# Patient Record
Sex: MALE | Race: WHITE | NOT HISPANIC OR LATINO | Employment: FULL TIME | URBAN - METROPOLITAN AREA
[De-identification: names, ages, dates, MRNs, and addresses within clinical notes are randomized per-mention and may not be internally consistent; named-entity substitution may affect disease eponyms.]

---

## 2024-04-09 RX ORDER — TAMSULOSIN HYDROCHLORIDE 0.4 MG/1
0.4 CAPSULE ORAL NIGHTLY
Qty: 90 CAPSULE | Refills: 1 | Status: SHIPPED | OUTPATIENT
Start: 2024-04-09 | End: 2025-04-09

## 2024-04-12 ENCOUNTER — OFFICE VISIT (OUTPATIENT)
Dept: UROLOGY | Facility: CLINIC | Age: 59
End: 2024-04-12
Payer: COMMERCIAL

## 2024-04-12 VITALS
BODY MASS INDEX: 34.39 KG/M2 | TEMPERATURE: 97 F | HEIGHT: 68 IN | RESPIRATION RATE: 14 BRPM | DIASTOLIC BLOOD PRESSURE: 79 MMHG | SYSTOLIC BLOOD PRESSURE: 122 MMHG | HEART RATE: 75 BPM | WEIGHT: 226.94 LBS

## 2024-04-12 DIAGNOSIS — R39.14 FEELING OF INCOMPLETE BLADDER EMPTYING: ICD-10-CM

## 2024-04-12 DIAGNOSIS — N39.43 POST-VOID DRIBBLING: Primary | ICD-10-CM

## 2024-04-12 LAB
BILIRUB UR QL STRIP: NEGATIVE
GLUCOSE UR QL STRIP: NEGATIVE
KETONES UR QL STRIP: NEGATIVE
LEUKOCYTE ESTERASE UR QL STRIP: NEGATIVE
PH, POC UA: 7
POC BLOOD, URINE: NEGATIVE
POC NITRATES, URINE: NEGATIVE
POC RESIDUAL URINE VOLUME: 0 ML (ref 0–100)
PROT UR QL STRIP: NEGATIVE
SP GR UR STRIP: 1.02 (ref 1–1.03)
UROBILINOGEN UR STRIP-ACNC: 1 (ref 0.3–2.2)

## 2024-04-12 PROCEDURE — 3074F SYST BP LT 130 MM HG: CPT | Mod: CPTII,S$GLB,, | Performed by: UROLOGY

## 2024-04-12 PROCEDURE — 99999 PR PBB SHADOW E&M-EST. PATIENT-LVL III: CPT | Mod: PBBFAC,,, | Performed by: UROLOGY

## 2024-04-12 PROCEDURE — 81003 URINALYSIS AUTO W/O SCOPE: CPT | Mod: QW,S$GLB,, | Performed by: UROLOGY

## 2024-04-12 PROCEDURE — 3078F DIAST BP <80 MM HG: CPT | Mod: CPTII,S$GLB,, | Performed by: UROLOGY

## 2024-04-12 PROCEDURE — 51798 US URINE CAPACITY MEASURE: CPT | Mod: S$GLB,,, | Performed by: UROLOGY

## 2024-04-12 PROCEDURE — 99214 OFFICE O/P EST MOD 30 MIN: CPT | Mod: S$GLB,,, | Performed by: UROLOGY

## 2024-04-12 PROCEDURE — 1159F MED LIST DOCD IN RCRD: CPT | Mod: CPTII,S$GLB,, | Performed by: UROLOGY

## 2024-04-12 PROCEDURE — 3008F BODY MASS INDEX DOCD: CPT | Mod: CPTII,S$GLB,, | Performed by: UROLOGY

## 2024-04-12 RX ORDER — TAMSULOSIN HYDROCHLORIDE 0.4 MG/1
0.4 CAPSULE ORAL DAILY
Qty: 90 CAPSULE | Refills: 3 | Status: SHIPPED | OUTPATIENT
Start: 2024-04-12 | End: 2025-04-12

## 2024-04-12 NOTE — PROGRESS NOTES
"Subjective:       Patient ID: Paul Juan is a 58 y.o. male.    Chief Complaint: Followup      History of Present Illness:     Mr Juan has post void dribbling and feelings of incomplete bladder emptying that has improved with tamsulosin.  I prescribed him alfuzosin on 4-5-23 but tamsulosin works better than alfuzosin.  Normal urinary stream.  No straining to void.  No nocturia.           No past medical history on file.  No family history on file.  Social History     Socioeconomic History    Marital status:    Tobacco Use    Smoking status: Never    Smokeless tobacco: Never   Substance and Sexual Activity    Alcohol use: Not Currently    Drug use: Never    Sexual activity: Yes     Outpatient Encounter Medications as of 4/12/2024   Medication Sig Dispense Refill    tamsulosin (FLOMAX) 0.4 mg Cap Take 1 capsule (0.4 mg total) by mouth every evening. 90 capsule 1    tamsulosin (FLOMAX) 0.4 mg Cap Take 1 capsule (0.4 mg total) by mouth once daily. 90 capsule 3     No facility-administered encounter medications on file as of 4/12/2024.        Review of Systems   Constitutional:  Negative for chills and fever.   Respiratory:  Negative for shortness of breath.    Cardiovascular:  Negative for chest pain.   Gastrointestinal:  Negative for nausea and vomiting.   Genitourinary:  Negative for frequency and urgency.   Musculoskeletal:  Negative for back pain.   Skin:  Negative for rash.   Neurological:  Negative for dizziness.   Psychiatric/Behavioral:  Negative for agitation.        Objective:     /79   Pulse 75   Temp 97 °F (36.1 °C) (Oral)   Resp 14   Ht 5' 8" (1.727 m)   Wt 103 kg (226 lb 15.4 oz)   BMI 34.51 kg/m²     Physical Exam  Constitutional:       Appearance: Normal appearance.   Pulmonary:      Effort: Pulmonary effort is normal.   Abdominal:      Palpations: Abdomen is soft.   Genitourinary:     Comments: He says his prostate was recently examined by his PCP.  Neurological:      Mental " Status: He is alert and oriented to person, place, and time.   Psychiatric:         Mood and Affect: Mood normal.         Office Visit on 04/12/2024   Component Date Value Ref Range Status    POC Blood, Urine 04/12/2024 Negative  Negative Final    POC Bilirubin, Urine 04/12/2024 Negative  Negative Final    POC Urobilinogen, Urine 04/12/2024 1.0  0.3 - 2.2 Final    POC Ketones, Urine 04/12/2024 Negative  Negative Final    POC Protein, Urine 04/12/2024 Negative  Negative Final    POC Nitrates, Urine 04/12/2024 Negative  Negative Final    POC Glucose, Urine 04/12/2024 Negative  Negative Final    pH, UA 04/12/2024 7.0   Final    POC Specific Gravity, Urine 04/12/2024 1.020  1.003 - 1.029 Final    POC Leukocytes, Urine 04/12/2024 Negative  Negative Final    POC Residual Urine Volume 04/12/2024 0  0 - 100 mL Final        Results for orders placed or performed in visit on 04/12/24 (from the past 8760 hour(s))   POCT Bladder Scan   Result Value    POC Residual Urine Volume 0          3-15-24  PSA 0.2    3-9-22  PSA 0.2    2-18-21  PSA 0.2    12-17-19  PSA 0.22    11-15-18  PSA 0.16    I reviewed all of the above lab results.         Assessment:       1. Post-void dribbling    2. Feeling of incomplete bladder emptying      Plan:     Orders Placed This Encounter    POCT Urinalysis, Dipstick, Automated, W/O Scope    POCT Bladder Scan    tamsulosin (FLOMAX) 0.4 mg Cap        Assessment:  - Post void dribbling that has improved with tamsulosin.  I prescribed him alfuzosin on 4-5-23 but tamsulosin works better than alfuzosin.  - Feelings of incomplete bladder emptying that has improved with tamsulosin.  PVR is 0 ml today on 4-12-24.    Plan:  - Refilled tamsulosin 0.4 mg 1 PO qdaily.  - RTC in 1 year with a PVR on arrival or sooner as needed.

## 2024-07-10 RX ORDER — TAMSULOSIN HYDROCHLORIDE 0.4 MG/1
0.4 CAPSULE ORAL DAILY
Qty: 90 CAPSULE | Refills: 3 | Status: SHIPPED | OUTPATIENT
Start: 2024-07-10 | End: 2025-07-10

## 2025-04-04 ENCOUNTER — TELEPHONE (OUTPATIENT)
Dept: UROLOGY | Facility: CLINIC | Age: 60
End: 2025-04-04
Payer: COMMERCIAL

## 2025-04-30 ENCOUNTER — OFFICE VISIT (OUTPATIENT)
Dept: UROLOGY | Facility: CLINIC | Age: 60
End: 2025-04-30
Payer: COMMERCIAL

## 2025-04-30 VITALS
WEIGHT: 227.06 LBS | BODY MASS INDEX: 34.41 KG/M2 | DIASTOLIC BLOOD PRESSURE: 89 MMHG | HEART RATE: 76 BPM | SYSTOLIC BLOOD PRESSURE: 128 MMHG | HEIGHT: 68 IN

## 2025-04-30 DIAGNOSIS — N39.43 POST-VOID DRIBBLING: Primary | ICD-10-CM

## 2025-04-30 PROCEDURE — 3008F BODY MASS INDEX DOCD: CPT | Mod: CPTII,S$GLB,, | Performed by: UROLOGY

## 2025-04-30 PROCEDURE — 3074F SYST BP LT 130 MM HG: CPT | Mod: CPTII,S$GLB,, | Performed by: UROLOGY

## 2025-04-30 PROCEDURE — 99999 PR PBB SHADOW E&M-EST. PATIENT-LVL III: CPT | Mod: PBBFAC,,, | Performed by: UROLOGY

## 2025-04-30 PROCEDURE — 1159F MED LIST DOCD IN RCRD: CPT | Mod: CPTII,S$GLB,, | Performed by: UROLOGY

## 2025-04-30 PROCEDURE — 3079F DIAST BP 80-89 MM HG: CPT | Mod: CPTII,S$GLB,, | Performed by: UROLOGY

## 2025-04-30 PROCEDURE — 99213 OFFICE O/P EST LOW 20 MIN: CPT | Mod: S$GLB,,, | Performed by: UROLOGY

## 2025-04-30 RX ORDER — TAMSULOSIN HYDROCHLORIDE 0.4 MG/1
0.4 CAPSULE ORAL DAILY
Qty: 90 CAPSULE | Refills: 3 | Status: SHIPPED | OUTPATIENT
Start: 2025-04-30 | End: 2026-04-30

## 2025-04-30 NOTE — PROGRESS NOTES
"Subjective:       Patient ID: Paul Juan is a 59 y.o. male.    Chief Complaint: Follow-up      History of Present Illness:     Mr Juan has post void dribbling that has improved with tamsulosin.  I prescribed him alfuzosin on 4-5-23 but tamsulosin works better than alfuzosin.  He feels that he is emptying his bladder.  His PVR today on 4-30-25 is 20 ml.  PVR was 0 ml on 4-12-24.  Normal urinary flow.  No urinary hesitancy.  No family history of prostate cancer.      Follow-up  Pertinent negatives include no chest pain, chills, fever, nausea, rash or vomiting.        History reviewed. No pertinent past medical history.  No family history on file.  Social History[1]  Encounter Medications[2]     Review of Systems   Constitutional:  Negative for chills and fever.   Respiratory:  Negative for shortness of breath.    Cardiovascular:  Negative for chest pain.   Gastrointestinal:  Negative for nausea and vomiting.   Genitourinary:  Negative for difficulty urinating.   Musculoskeletal:  Negative for back pain.   Skin:  Negative for rash.   Neurological:  Negative for dizziness.   Psychiatric/Behavioral:  Negative for agitation.        Objective:     /89   Pulse 76   Ht 5' 8" (1.727 m)   Wt 103 kg (227 lb 1.2 oz)   BMI 34.53 kg/m²     Physical Exam  Constitutional:       Appearance: Normal appearance.   Pulmonary:      Effort: Pulmonary effort is normal.   Abdominal:      Palpations: Abdomen is soft.   Genitourinary:     Comments: He says his prostate was recently examined by his PCP.  Neurological:      Mental Status: He is alert and oriented to person, place, and time.   Psychiatric:         Mood and Affect: Mood normal.         No visits with results within 1 Day(s) from this visit.   Latest known visit with results is:   Office Visit on 04/12/2024   Component Date Value Ref Range Status    POC Blood, Urine 04/12/2024 Negative  Negative Final    POC Bilirubin, Urine 04/12/2024 Negative  Negative Final    " POC Urobilinogen, Urine 04/12/2024 1.0  0.3 - 2.2 Final    POC Ketones, Urine 04/12/2024 Negative  Negative Final    POC Protein, Urine 04/12/2024 Negative  Negative Final    POC Nitrates, Urine 04/12/2024 Negative  Negative Final    POC Glucose, Urine 04/12/2024 Negative  Negative Final    pH, UA 04/12/2024 7.0   Final    POC Specific Gravity, Urine 04/12/2024 1.020  1.003 - 1.029 Final    POC Leukocytes, Urine 04/12/2024 Negative  Negative Final    POC Residual Urine Volume 04/12/2024 0  0 - 100 mL Final        No results found for this or any previous visit (from the past 8760 hours).     Assessment:       1. Post-void dribbling        Plan:     Orders Placed This Encounter    tamsulosin (FLOMAX) 0.4 mg Cap        3-19-25  PSA 0.2    3-15-24  PSA 0.2     3-9-22  PSA 0.2     2-18-21  PSA 0.2     12-17-19  PSA 0.22     11-15-18  PSA 0.16    3-19-25  Cr 0.98.  GFR 90.     I reviewed all of the above lab results.         Assessment:  - Post void dribbling that has improved with tamsulosin.  I prescribed him alfuzosin on 4-5-23 but tamsulosin works better than alfuzosin.  PVR today on 4-30-25 is 20 ml.  PVR was 0 ml on 4-12-24.     Plan:  - Refilled tamsulosin 0.4 mg 1 PO qdaily.  - RTC in 1 year with a PVR on arrival or sooner as needed.                 [1]   Social History  Socioeconomic History    Marital status:    Tobacco Use    Smoking status: Never    Smokeless tobacco: Never   Substance and Sexual Activity    Alcohol use: Not Currently    Drug use: Never    Sexual activity: Yes     Social Drivers of Health     Financial Resource Strain: Low Risk  (3/12/2025)    Received from [facility] and Its Subsidiaries and Affiliates    Overall Financial Resource Strain (CARDIA)     Difficulty of Paying Living Expenses: Not hard at all   Food Insecurity: No Food Insecurity (3/12/2025)    Received from [facility] and Its Subsidiaries and  Affiliates    Hunger Vital Sign     Worried About Running Out of Food in the Last Year: Never true     Ran Out of Food in the Last Year: Never true   Transportation Needs: No Transportation Needs (3/12/2025)    Received from [facility] and Its Subsidiaries and Affiliates    PRAPARE - Transportation     Lack of Transportation (Medical): No     Lack of Transportation (Non-Medical): No   Physical Activity: Insufficiently Active (3/12/2025)    [facility]    Exercise Vital Sign     Days of Exercise per Week: 2 days     Minutes of Exercise per Session: 30 min   Stress: No Stress Concern Present (3/12/2025)    Received from [facility] and Its Subsidiaries and Affiliates    [employer] - Occupational Stress Questionnaire     Feeling of Stress : Not at all   Housing Stability: Low Risk  (3/12/2025)    [facility]    Housing Stability Vital Sign     Unable to Pay for Housing in the Last Year: No     Number of Times Moved in the Last Year: 0     Homeless in the Last Year: No   [2]   Outpatient Encounter Medications as of 4/30/2025   Medication Sig Dispense Refill    tamsulosin (FLOMAX) 0.4 mg Cap Take 1 capsule (0.4 mg total) by mouth once daily. 90 capsule 3    tamsulosin (FLOMAX) 0.4 mg Cap Take 1 capsule (0.4 mg total) by mouth every evening. 90 capsule 1    tamsulosin (FLOMAX) 0.4 mg Cap Take 1 capsule (0.4 mg total) by mouth once daily. 90 capsule 3     No facility-administered encounter medications on file as of 4/30/2025.